# Patient Record
Sex: MALE | Race: OTHER | Employment: OTHER | URBAN - METROPOLITAN AREA
[De-identification: names, ages, dates, MRNs, and addresses within clinical notes are randomized per-mention and may not be internally consistent; named-entity substitution may affect disease eponyms.]

---

## 2023-07-21 ENCOUNTER — APPOINTMENT (OUTPATIENT)
Dept: CT IMAGING | Facility: HOSPITAL | Age: 78
End: 2023-07-21
Attending: EMERGENCY MEDICINE
Payer: MEDICARE

## 2023-07-21 ENCOUNTER — HOSPITAL ENCOUNTER (EMERGENCY)
Facility: HOSPITAL | Age: 78
Discharge: HOME | End: 2023-07-21
Attending: EMERGENCY MEDICINE
Payer: MEDICARE

## 2023-07-21 VITALS
TEMPERATURE: 98 F | OXYGEN SATURATION: 96 % | DIASTOLIC BLOOD PRESSURE: 97 MMHG | SYSTOLIC BLOOD PRESSURE: 135 MMHG | RESPIRATION RATE: 16 BRPM | HEART RATE: 88 BPM

## 2023-07-21 DIAGNOSIS — S01.81XA LACERATION OF FOREHEAD, INITIAL ENCOUNTER: ICD-10-CM

## 2023-07-21 DIAGNOSIS — S09.90XA INJURY OF HEAD, INITIAL ENCOUNTER: Primary | ICD-10-CM

## 2023-07-21 LAB — GLUCOSE BLDC GLUCOMTR-MCNC: 167 MG/DL (ref 70–99)

## 2023-07-21 PROCEDURE — 82962 GLUCOSE BLOOD TEST: CPT

## 2023-07-21 PROCEDURE — 12001 RPR S/N/AX/GEN/TRNK 2.5CM/<: CPT

## 2023-07-21 PROCEDURE — 70450 CT HEAD/BRAIN W/O DYE: CPT | Performed by: EMERGENCY MEDICINE

## 2023-07-21 PROCEDURE — 99284 EMERGENCY DEPT VISIT MOD MDM: CPT

## 2023-07-21 RX ORDER — LIDOCAINE HYDROCHLORIDE AND EPINEPHRINE 20; 5 MG/ML; UG/ML
20 INJECTION, SOLUTION EPIDURAL; INFILTRATION; INTRACAUDAL; PERINEURAL ONCE
Status: COMPLETED | OUTPATIENT
Start: 2023-07-21 | End: 2023-07-21

## 2023-07-21 NOTE — ED INITIAL ASSESSMENT (HPI)
Patient ambulatory to triage with wife, c/o laceration to left forehead. Approx 2 cm in length. Denies any blood tinners. Patient tripped and fell hitting his head on the corner of a wall.

## 2023-07-22 NOTE — ED QUICK NOTES
Pt becoming increasingly agitated, asking repetitive questions about when the results will be back. Pt also asking for food and drink, per MD, pt will need to wait for CT results. Pt educated on importance of waiting for results. Pt's wife is at bedside and states that he has a hx of dementia. Care ongoing.

## 2023-07-22 NOTE — DISCHARGE INSTRUCTIONS
Return to emergency department for headache, vomiting, changes in mentation, weakness, numbness, losing stool/urine on yourself. You are at risk for delayed brain bleed after trauma. At this time there are no signs of bleed or fracture noted on your CT. Return to emergency room for any fevers of 100.4, redness around laceration site, pus from laceration site. Wound recheck in 2 days with primary care provider/urgent care. Suture removal in 5-7 days. The Emergency Department is not intended to be a substitute for an effort to provide complete medical care. The imaging, if any, have often been interpreted on a preliminary basis pending final reading by the radiologist. If your blood pressure was greater than 140/90, please have this blood pressure rechecked by your primary care provider in the next several days.